# Patient Record
Sex: FEMALE | Race: AMERICAN INDIAN OR ALASKA NATIVE
[De-identification: names, ages, dates, MRNs, and addresses within clinical notes are randomized per-mention and may not be internally consistent; named-entity substitution may affect disease eponyms.]

---

## 2022-01-26 ENCOUNTER — HOSPITAL ENCOUNTER (EMERGENCY)
Dept: HOSPITAL 5 - ED | Age: 72
Discharge: HOME | End: 2022-01-26
Payer: MEDICARE

## 2022-01-26 VITALS — DIASTOLIC BLOOD PRESSURE: 69 MMHG | SYSTOLIC BLOOD PRESSURE: 162 MMHG

## 2022-01-26 DIAGNOSIS — J45.909: ICD-10-CM

## 2022-01-26 DIAGNOSIS — R10.31: Primary | ICD-10-CM

## 2022-01-26 DIAGNOSIS — Z88.0: ICD-10-CM

## 2022-01-26 DIAGNOSIS — Z91.013: ICD-10-CM

## 2022-01-26 LAB
ALBUMIN SERPL-MCNC: 4.7 G/DL (ref 3.9–5)
ALT SERPL-CCNC: 17 UNITS/L (ref 7–56)
BASOPHILS # (AUTO): 0.1 K/MM3 (ref 0–0.1)
BASOPHILS NFR BLD AUTO: 0.8 % (ref 0–1.8)
BILIRUB UR QL STRIP: (no result)
BLOOD UR QL VISUAL: (no result)
BUN SERPL-MCNC: 15 MG/DL (ref 7–17)
BUN/CREAT SERPL: 15 %
CALCIUM SERPL-MCNC: 9.7 MG/DL (ref 8.4–10.2)
EOSINOPHIL # BLD AUTO: 0.1 K/MM3 (ref 0–0.4)
EOSINOPHIL NFR BLD AUTO: 1.3 % (ref 0–4.3)
HCT VFR BLD CALC: 40.8 % (ref 30.3–42.9)
HEMOLYSIS INDEX: 3
HGB BLD-MCNC: 12.7 GM/DL (ref 10.1–14.3)
LYMPHOCYTES # BLD AUTO: 1.7 K/MM3 (ref 1.2–5.4)
LYMPHOCYTES NFR BLD AUTO: 24.7 % (ref 13.4–35)
MCHC RBC AUTO-ENTMCNC: 31 % (ref 30–34)
MCV RBC AUTO: 80 FL (ref 79–97)
MONOCYTES # (AUTO): 0.5 K/MM3 (ref 0–0.8)
MONOCYTES % (AUTO): 7.2 % (ref 0–7.3)
PH UR STRIP: 5 [PH] (ref 5–7)
PLATELET # BLD: 249 K/MM3 (ref 140–440)
PROT UR STRIP-MCNC: (no result) MG/DL
RBC # BLD AUTO: 5.14 M/MM3 (ref 3.65–5.03)
RBC #/AREA URNS HPF: < 1 /HPF (ref 0–6)
UROBILINOGEN UR-MCNC: < 2 MG/DL (ref ?–2)
WBC #/AREA URNS HPF: < 1 /HPF (ref 0–6)

## 2022-01-26 PROCEDURE — 74177 CT ABD & PELVIS W/CONTRAST: CPT

## 2022-01-26 PROCEDURE — 96374 THER/PROPH/DIAG INJ IV PUSH: CPT

## 2022-01-26 PROCEDURE — 36415 COLL VENOUS BLD VENIPUNCTURE: CPT

## 2022-01-26 PROCEDURE — 83690 ASSAY OF LIPASE: CPT

## 2022-01-26 PROCEDURE — 93005 ELECTROCARDIOGRAM TRACING: CPT

## 2022-01-26 PROCEDURE — 85025 COMPLETE CBC W/AUTO DIFF WBC: CPT

## 2022-01-26 PROCEDURE — 96375 TX/PRO/DX INJ NEW DRUG ADDON: CPT

## 2022-01-26 PROCEDURE — 99284 EMERGENCY DEPT VISIT MOD MDM: CPT

## 2022-01-26 PROCEDURE — 81001 URINALYSIS AUTO W/SCOPE: CPT

## 2022-01-26 PROCEDURE — 93010 ELECTROCARDIOGRAM REPORT: CPT

## 2022-01-26 PROCEDURE — 80053 COMPREHEN METABOLIC PANEL: CPT

## 2022-01-26 NOTE — EMERGENCY DEPARTMENT REPORT
ED General Adult HPI





- General


Chief complaint: Abdominal Pain


Stated complaint: RIGHT SIDE ABD PAIN


PUI?: No


Time Seen by Provider: 01/26/22 08:40


Source: patient, RN notes reviewed, old records reviewed


Mode of arrival: Ambulatory


Limitations: No Limitations





- History of Present Illness


Initial comments: 





The patient was evaluated in the emergency department for symptoms described in 

the history of present illness.  He/she was evaluated in the context of the 

global COVID-19 pandemic, which necessitated consideration that the patient 

might be at risk for infection with the virus that causes COVID-19.  

Institutional protocols and algorithms that pertain to the evaluation of 

patients at risk for COVID-19 are in a state of rapid change based on 

information released by regulatory bodies including the CDC and federal and [facility] organizations.  These policies and algorithms were followed during the 

patient's care in the emergency department.  Please note that these policies, 

procedures and recommendations changed on a rapid basis.





Primary CARE doctor: Dr. VANIA Oliveira





The patient is a 71-year-old female.  She is not known to myself previously.  

She reports that she is COVID-19 vaccinated.  She presents to the ER today with 

complaint of nontraumatic abdominal pain.  The abdominal pain is right lower 

quadrant, and right flank and radiates around to the back.  Positive nausea.  No

vomiting.  No fever.  Denies severe headache, neck pain, chest pain, 

hematemesis, bright red blood per rectum.  Pain is sharp and throbbing, 

increases with palpation and decreases with rest.


-: Gradual, hour(s), days(s)


Location: abdomen


Radiation: back


Severity scale (0 -10): 9


Quality: other


Consistency: other


Improves with: other


Worsens with: other


Associated Symptoms: other





- Related Data


                                  Previous Rx's











 Medication  Instructions  Recorded  Last Taken  Type


 


Acetaminophen [Non-Aspirin Extra 500 mg PO Q6HR PRN #30 tablet 01/26/22 Unknown 

Rx





Strength]    


 


Ibuprofen [Motrin] 200 mg PO Q6H PRN #30 tablet 01/26/22 Unknown Rx


 


Ondansetron [Zofran Odt] 4 mg PO Q8HR PRN #20 tab.rapdis 01/26/22 Unknown Rx











                                    Allergies











Allergy/AdvReac Type Severity Reaction Status Date / Time


 


Penicillins Allergy  Shortness Verified 01/26/22 05:34





   of Breath  


 


shellfish derived Allergy  Shortness Verified 01/26/22 05:36





   of Breath  














ED Review of Systems


ROS: 


Stated complaint: RIGHT SIDE ABD PAIN


Other details as noted in HPI








ED Past Medical Hx





- Past Medical History


Previous Medical History?: Yes


Hx Asthma: Yes





- Surgical History


Past Surgical History?: Yes


Additional Surgical History: Bilateral knee replacements, Ectopic pregnancy





- Medications


Home Medications: 


                                Home Medications











 Medication  Instructions  Recorded  Confirmed  Last Taken  Type


 


Acetaminophen [Non-Aspirin Extra 500 mg PO Q6HR PRN #30 tablet 01/26/22  Unknown

 Rx





Strength]     


 


Ibuprofen [Motrin] 200 mg PO Q6H PRN #30 tablet 01/26/22  Unknown Rx


 


Ondansetron [Zofran Odt] 4 mg PO Q8HR PRN #20 tab.rapdis 01/26/22  Unknown Rx














ED Physical Exam





- General


Limitations: No Limitations


General appearance: alert, obese





- Head


Head exam: Present: atraumatic, normocephalic





- Eye


Eye exam: Present: normal appearance, EOMI.  Absent: nystagmus





- ENT


ENT exam: Present: normal exam, normal orophraynx, mucous membranes moist, 

normal external ear exam





- Neck


Neck exam: Present: normal inspection, full ROM.  Absent: tenderness, 

meningismus





- Respiratory


Respiratory exam: Present: normal lung sounds bilaterally.  Absent: respiratory 

distress, wheezes, rales, rhonchi, stridor, decreased breath sounds





- Cardiovascular


Cardiovascular Exam: Present: regular rate, normal rhythm, normal heart sounds. 

Absent: bradycardia, tachycardia, irregular rhythm, systolic murmur, diastolic 

murmur, rubs, gallop





- GI/Abdominal


GI/Abdominal exam: Present: soft, tenderness, other (There is right flank, there

is right lower quadrant tenderness).  Absent: distended, guarding, rebound, 

rigid, pulsatile mass





- Extremities Exam


Extremities exam: Present: normal inspection, full ROM, other (2+ pulses noted 

in the bilateral upper and lower extremities.  There is no palpable cord.   

negative Homans sign.  Muscular compartments are soft.  The pelvis is stable.). 

Absent: pedal edema, calf tenderness





- Back Exam


Back exam: Present: normal inspection, full ROM.  Absent: tenderness, CVA 

tenderness (R), CVA tenderness (L), paraspinal tenderness, vertebral tenderness





- Neurological Exam


Neurological exam: Present: alert, oriented X3, normal gait, other (No facial 

droop.  Tongue midline.  Extraocular movements intact bilaterally.  Facial se

nsation intact to light touch in V1, V2, V3 distribution bilaterally.  5 and a 5

strength in 4 extremities.  Sensation intact to light touch in 4 extremities.). 

Absent: motor sensory deficit





- Psychiatric


Psychiatric exam: Present: normal affect, normal mood





- Skin


Skin exam: Present: warm, dry, intact, normal color.  Absent: rash





ED Course


                                   Vital Signs











  01/26/22





  05:40


 


Temperature 98.9 F


 


Pulse Rate 78


 


Respiratory 20





Rate 


 


Blood Pressure 153/78





[Right] 


 


O2 Sat by Pulse 98





Oximetry 














ED Medical Decision Making





- Lab Data


Result diagrams: 


                                 01/26/22 05:57





                                 01/26/22 05:57








                                   Vital Signs











  01/26/22





  05:40


 


Temperature 98.9 F


 


Pulse Rate 78


 


Respiratory 20





Rate 


 


Blood Pressure 153/78





[Right] 


 


O2 Sat by Pulse 98





Oximetry 











                                   Lab Results











  01/26/22 01/26/22 01/26/22 Range/Units





  05:57 05:57 Unknown 


 


WBC  6.9    (4.5-11.0)  K/mm3


 


RBC  5.14 H    (3.65-5.03)  M/mm3


 


Hgb  12.7    (10.1-14.3)  gm/dl


 


Hct  40.8    (30.3-42.9)  %


 


MCV  80    (79-97)  fl


 


MCH  25 L    (28-32)  pg


 


MCHC  31    (30-34)  %


 


RDW  14.9    (13.2-15.2)  %


 


Plt Count  249    (140-440)  K/mm3


 


Lymph % (Auto)  24.7    (13.4-35.0)  %


 


Mono % (Auto)  7.2    (0.0-7.3)  %


 


Eos % (Auto)  1.3    (0.0-4.3)  %


 


Baso % (Auto)  0.8    (0.0-1.8)  %


 


Lymph # (Auto)  1.7    (1.2-5.4)  K/mm3


 


Mono # (Auto)  0.5    (0.0-0.8)  K/mm3


 


Eos # (Auto)  0.1    (0.0-0.4)  K/mm3


 


Baso # (Auto)  0.1    (0.0-0.1)  K/mm3


 


Seg Neutrophils %  66.0    (40.0-70.0)  %


 


Seg Neutrophils #  4.5    (1.8-7.7)  K/mm3


 


Sodium   140   (137-145)  mmol/L


 


Potassium   3.7   (3.6-5.0)  mmol/L


 


Chloride   100.9   ()  mmol/L


 


Carbon Dioxide   25   (22-30)  mmol/L


 


Anion Gap   18   mmol/L


 


BUN   15   (7-17)  mg/dL


 


Creatinine   1.0   (0.6-1.2)  mg/dL


 


Estimated GFR   55   ml/min


 


BUN/Creatinine Ratio   15   %


 


Glucose   134 H   ()  mg/dL


 


Calcium   9.7   (8.4-10.2)  mg/dL


 


Total Bilirubin   0.30   (0.1-1.2)  mg/dL


 


AST   19   (5-40)  units/L


 


ALT   17   (7-56)  units/L


 


Alkaline Phosphatase   76   ()  units/L


 


Total Protein   7.7   (6.3-8.2)  g/dL


 


Albumin   4.7   (3.9-5)  g/dL


 


Albumin/Globulin Ratio   1.6   %


 


Lipase   37   (13-60)  units/L


 


Urine Color    Yellow  (Yellow)  


 


Urine Turbidity    Clear  (Clear)  


 


Urine pH    5.0  (5.0-7.0)  


 


Ur Specific Gravity    1.015  (1.003-1.030)  


 


Urine Protein    <15 mg/dl  (Negative)  mg/dL


 


Urine Glucose (UA)    Neg  (Negative)  mg/dL


 


Urine Ketones    Neg  (Negative)  mg/dL


 


Urine Blood    Neg  (Negative)  


 


Urine Nitrite    Neg  (Negative)  


 


Urine Bilirubin    Neg  (Negative)  


 


Urine Urobilinogen    < 2.0  (<2.0)  mg/dL


 


Ur Leukocyte Esterase    Neg  (Negative)  


 


Urine WBC (Auto)    < 1.0  (0.0-6.0)  /HPF


 


Urine RBC (Auto)    < 1.0  (0.0-6.0)  /HPF














- EKG Data


-: EKG Interpreted by Me


EKG shows normal: sinus rhythm


Rate: normal





- EKG Data


When compared to previous EKG there are: previous EKG unavailable





01/26/22 10:58


The EKG is interpreted at 08: 35





Sinus rhythm, 73 bpm.  Normal axis, normal P wave axis, left ventricular 

hypertrophy, QTC 4 4 6 ms.  Abnormal EKG.  Not a STEMI.





- Radiology Data


Radiology results: pending, report reviewed, image reviewed





CT ABDOMEN AND PELVIS WITH CONTRAST  INDICATION / CLINICAL INFORMATION: rlq 

right flank abd pain 100 ML OMNI 300 .  TECHNIQUE: Axial CT images were obtained

 through the abdomen and pelvis after 100 cc of Omnipaque 300 IV contrast. 

Sagittal and coronal reformatted images. All CT scans at this location are p

erformed using CT dose reduction for ALARA by means of automated exposure 

control.  











COMPARISON: None available.  FINDINGS: LOWER CHEST: No significant abnormality. 

LIVER: No significant abnormality. GALLBLADDER: No significant abnormality. BILE

 DUCTS: No significant abnormality. PANCREAS: No significant abnormality. 

SPLEEN: No significant abnormality. ADRENALS: No significant abnormality. RIGHT 

KIDNEY and URETER: No significant abnormality. LEFT KIDNEY and URETER: No 

significant abnormality.  STOMACH and SMALL BOWEL: No significant abnormality. 

COLON: No significant abnormality. Mild diverticulosis is noted distally. 

APPENDIX: No significant abnormality. PERITONEUM: No free fluid. No free air. No

 fluid collection. LYMPH NODES: No significant adenopathy. AORTA and ARTERIES: 

Mild atherosclerotic calcification without acute abnormality. IVC and VEINS: No 

significant abnormality.  URINARY BLADDER: No significant abnormality. 

REPRODUCTIVE ORGANS: No significant abnormality.  











ADDITIONAL FINDINGS: None.  SKELETAL SYSTEM: Mild degenerative changes in the 

lower lumbar spine.  











IMPRESSION: No acute inflammatory process. No clear explanation for right lower 

quadrant or right flank pain.  Signer Name: Tyson Damon Jr, MD Signed: 

1/26/2022 9:32 AM Workstation Name: [id number]





- Medical Decision Making





Differential diagnosis, including but not limited to: Appendicitis, renal colic,

 colitis, diverticulitis, constipation, obstruction, radicular pain


Allodynia











Assessment and plan: 71-year-old female, who was afebrile, with reassuring vital

 signs, who presents with right lower quadrant and right flank pain.  Laboratory

 studies unremarkable.  EKG unremarkable.  Discussed recommendation for CT scan 

of the abdomen pelvis with IV contrast.  Patient reports nonspecific rash 

associated with iodine, she is not quite certain if she has had IV contrast in 

the past, but to the best of her knowledge, does not have anaphylactic or 

anaphylactoid association with iodine administration.  Patient premedicated 

appropriately, and a CT scan of the abdomen pelvis is obtained, which shows no 

acute findings.  On repeat examination, patient on cell phone, and in no acute 

distress, with a soft benign belly.





Explained significance of laboratory studies and CT scan findings to patient.





Have recommended 24-hour follow-up for repeat checkup and evaluation.





Given that she does not have significant abdominal tenderness at this time, 

given that she appears to be resting comfortably, has unremarkable laboratory 

studies, vital signs, diagnostic work-up and evaluation, and reports reliability

 to follow-up in 24 hours for repeat checkup, I think discharge with close 

return precautions is reasonable.  I have discussed this with the patient.  She 

articulates understanding.





Return precautions reviewed.








Critical care attestation.: 


If time is entered above; I have spent that time in minutes in the direct care 

of this critically ill patient, excluding procedure time.








ED Disposition


Clinical Impression: 


 Right sided abdominal pain





Disposition: 01 HOME / SELF CARE / HOMELESS


Is pt being admited?: No


Does the pt Need Aspirin: No


Condition: Good


Instructions:  Abdominal Pain (ED), Abdominal Pain, Adult, Easy-to-Read


Additional Instructions: 


Do not take metformin medication for the next 2 days, if patient takes this 

medication.





Please take the prescribed pain medication and nausea medications as needed and 

directed.





Recommend follow-up in 1 day / 24 hours with either primary care doctor, urgent 

care center, or return to this emergency room for repeat checkup, evaluation, 

and repeat abdominal examination.





Laboratory studies, EKG, urinalysis, CT scan of the abdomen pelvis did not 

demonstrate any acutely abnormal findings, or any actionable findings that would

 require emergent intervention.





Please return to the emergency room right away with new pain, worsened pain, 

migration of pain, projectile vomiting, change in mental status, confusion, 

inability tolerate liquid feeds, new, worsened or different symptoms not present

 on the initial emergency room evaluation


Referrals: 


[facility] [Provider Group] - 24 Hours

## 2022-01-26 NOTE — CAT SCAN REPORT
CT ABDOMEN AND PELVIS WITH CONTRAST



INDICATION / CLINICAL INFORMATION: rlq right flank abd pain  100 ML OMNI 300 .



TECHNIQUE:

Axial CT images were obtained through the abdomen and pelvis after 100 cc of Omnipaque 300 IV contras
t. Sagittal and coronal reformatted images. All CT scans at this location are performed using CT dose
 reduction for ALARA by means of automated exposure control. 



COMPARISON: None available.



FINDINGS:

LOWER CHEST: No significant abnormality.

LIVER: No significant abnormality.

GALLBLADDER: No significant abnormality.  

BILE DUCTS: No significant abnormality.

PANCREAS: No significant abnormality.

SPLEEN: No significant abnormality.

ADRENALS: No significant abnormality.

RIGHT KIDNEY and URETER: No significant abnormality.

LEFT KIDNEY and URETER: No significant abnormality.



STOMACH and SMALL BOWEL: No significant abnormality. 

COLON: No significant abnormality. Mild diverticulosis is noted distally.

APPENDIX: No significant abnormality.  

PERITONEUM: No free fluid. No free air. No fluid collection.

LYMPH NODES: No significant adenopathy.

AORTA and ARTERIES: Mild atherosclerotic calcification without acute abnormality. 

IVC and VEINS: No significant abnormality.



URINARY BLADDER: No significant abnormality.

REPRODUCTIVE ORGANS: No significant abnormality.



ADDITIONAL FINDINGS: None.



SKELETAL SYSTEM: Mild degenerative changes in the lower lumbar spine.



IMPRESSION:

 No acute inflammatory process. No clear explanation for right lower quadrant or right flank pain.



Signer Name: Tyson Damon Jr, MD 

Signed: 1/26/2022 10:32 AM

Workstation Name: [id number]

## 2022-01-27 NOTE — ELECTROCARDIOGRAPH REPORT
[facility]

                                       

Test Date:    2022               Test Time:    08:35:37

Pat Name:     ERINN MOREL         Department:   

Patient ID:   [id number]          Room:          

Gender:       F                        Technician:   ALFREDA

:          1950               Requested By: CHRIS DEMPSEY

Order Number: [id number]              Reading MD:   Pieter Nogueira

                                 Measurements

Intervals                              Axis          

Rate:         73                       P:            73

VA:           168                      QRS:          35

QRSD:         76                       T:            55

QT:           405                                    

QTc:          446                                    

                           Interpretive Statements

Sinus rhythm

nonspecific st-t

No previous ECG available for comparison

Electronically Signed On 2022 8:47:21 EST by Pieter Nogueira

## 2022-04-01 ENCOUNTER — DASHBOARD ENCOUNTERS (OUTPATIENT)
Age: 72
End: 2022-04-01

## 2022-04-01 ENCOUNTER — OFFICE VISIT (OUTPATIENT)
Dept: URBAN - METROPOLITAN AREA CLINIC 109 | Facility: CLINIC | Age: 72
End: 2022-04-01
Payer: MEDICARE

## 2022-04-01 VITALS
HEIGHT: 63 IN | BODY MASS INDEX: 41.53 KG/M2 | HEART RATE: 92 BPM | WEIGHT: 234.4 LBS | DIASTOLIC BLOOD PRESSURE: 72 MMHG | TEMPERATURE: 97.9 F | SYSTOLIC BLOOD PRESSURE: 139 MMHG

## 2022-04-01 DIAGNOSIS — Z12.11 SCREEN FOR COLON CANCER: ICD-10-CM

## 2022-04-01 DIAGNOSIS — K59.01 CONSTIPATION BY DELAYED COLONIC TRANSIT: ICD-10-CM

## 2022-04-01 DIAGNOSIS — K59.1 FUNCTIONAL DIARRHEA: ICD-10-CM

## 2022-04-01 DIAGNOSIS — R14.0 BLOATING: ICD-10-CM

## 2022-04-01 PROBLEM — 35298007: Status: ACTIVE | Noted: 2022-04-01

## 2022-04-01 PROCEDURE — 99204 OFFICE O/P NEW MOD 45 MIN: CPT | Performed by: INTERNAL MEDICINE

## 2022-04-01 RX ORDER — ROSUVASTATIN CALCIUM 20 MG/1
1 TABLET TABLET, FILM COATED ORAL ONCE A DAY
Status: ACTIVE | COMMUNITY

## 2022-04-01 RX ORDER — ROSUVASTATIN CALCIUM 10 MG/1
1 TABLET TABLET, FILM COATED ORAL ONCE A DAY
Status: ACTIVE | COMMUNITY

## 2022-04-01 RX ORDER — LISINOPRIL AND HYDROCHLOROTHIAZIDE 20; 12.5 MG/1; MG/1
1 TABLET TABLET ORAL ONCE A DAY
Status: ACTIVE | COMMUNITY

## 2022-04-01 RX ORDER — AMLODIPINE BESYLATE 5 MG/1
1 TABLET TABLET ORAL ONCE A DAY
Status: ACTIVE | COMMUNITY

## 2022-04-01 RX ORDER — DICYCLOMINE HYDROCHLORIDE 10 MG/1
1 TABLET CAPSULE ORAL THREE TIMES A DAY
Qty: 90 TABLET | Refills: 6 | OUTPATIENT
Start: 2022-04-01 | End: 2022-10-28

## 2022-04-01 RX ORDER — MONTELUKAST 10 MG/1
1 TABLET TABLET, FILM COATED ORAL ONCE A DAY
Status: ACTIVE | COMMUNITY

## 2022-04-01 NOTE — HPI-TODAY'S VISIT:
here for routine screening colon, last was 10+ years ago reports colonoscopy always normal reports for 1+ years lower cramping abd pain, stable, intermittant, worse with need for bm and better with bm and associated with constipation and or diarrhea and fecal urgency, will have good weeks and bad weeks, cannot define a particular trigger for the symptoms

## 2022-04-26 ENCOUNTER — OFFICE VISIT (OUTPATIENT)
Dept: URBAN - METROPOLITAN AREA SURGERY CENTER 23 | Facility: SURGERY CENTER | Age: 72
End: 2022-04-26
Payer: MEDICARE

## 2022-04-26 ENCOUNTER — CLAIMS CREATED FROM THE CLAIM WINDOW (OUTPATIENT)
Dept: URBAN - METROPOLITAN AREA CLINIC 4 | Facility: CLINIC | Age: 72
End: 2022-04-26
Payer: MEDICARE

## 2022-04-26 DIAGNOSIS — D12.2 BENIGN NEOPLASM OF ASCENDING COLON: ICD-10-CM

## 2022-04-26 DIAGNOSIS — D12.2 ADENOMA OF ASCENDING COLON: ICD-10-CM

## 2022-04-26 DIAGNOSIS — Z12.11 COLON CANCER SCREENING: ICD-10-CM

## 2022-04-26 DIAGNOSIS — K63.89 CYST OF DUODENUM: ICD-10-CM

## 2022-04-26 PROCEDURE — 45385 COLONOSCOPY W/LESION REMOVAL: CPT | Performed by: INTERNAL MEDICINE

## 2022-04-26 PROCEDURE — 88305 TISSUE EXAM BY PATHOLOGIST: CPT | Performed by: PATHOLOGY

## 2022-04-26 PROCEDURE — G8907 PT DOC NO EVENTS ON DISCHARG: HCPCS | Performed by: INTERNAL MEDICINE

## 2022-04-26 RX ORDER — AMLODIPINE BESYLATE 5 MG/1
1 TABLET TABLET ORAL ONCE A DAY
Status: ACTIVE | COMMUNITY

## 2022-04-26 RX ORDER — DICYCLOMINE HYDROCHLORIDE 10 MG/1
1 TABLET CAPSULE ORAL THREE TIMES A DAY
Qty: 90 TABLET | Refills: 6 | Status: ACTIVE | COMMUNITY
Start: 2022-04-01 | End: 2022-10-28

## 2022-04-26 RX ORDER — ROSUVASTATIN CALCIUM 20 MG/1
1 TABLET TABLET, FILM COATED ORAL ONCE A DAY
Status: ACTIVE | COMMUNITY

## 2022-04-26 RX ORDER — MONTELUKAST 10 MG/1
1 TABLET TABLET, FILM COATED ORAL ONCE A DAY
Status: ACTIVE | COMMUNITY

## 2022-04-26 RX ORDER — ROSUVASTATIN CALCIUM 10 MG/1
1 TABLET TABLET, FILM COATED ORAL ONCE A DAY
Status: ACTIVE | COMMUNITY

## 2022-04-26 RX ORDER — LISINOPRIL AND HYDROCHLOROTHIAZIDE 20; 12.5 MG/1; MG/1
1 TABLET TABLET ORAL ONCE A DAY
Status: ACTIVE | COMMUNITY

## 2025-06-03 ENCOUNTER — WEB ENCOUNTER (OUTPATIENT)
Dept: URBAN - METROPOLITAN AREA CLINIC 118 | Facility: CLINIC | Age: 75
End: 2025-06-03

## 2025-06-09 ENCOUNTER — TELEPHONE ENCOUNTER (OUTPATIENT)
Dept: URBAN - METROPOLITAN AREA CLINIC 109 | Facility: CLINIC | Age: 75
End: 2025-06-09

## 2025-06-09 ENCOUNTER — LAB OUTSIDE AN ENCOUNTER (OUTPATIENT)
Dept: URBAN - METROPOLITAN AREA SURGERY CENTER 23 | Facility: SURGERY CENTER | Age: 75
End: 2025-06-09

## 2025-06-10 ENCOUNTER — CLAIMS CREATED FROM THE CLAIM WINDOW (OUTPATIENT)
Dept: URBAN - METROPOLITAN AREA SURGERY CENTER 23 | Facility: SURGERY CENTER | Age: 75
End: 2025-06-10
Payer: MEDICARE

## 2025-06-10 ENCOUNTER — CLAIMS CREATED FROM THE CLAIM WINDOW (OUTPATIENT)
Dept: URBAN - METROPOLITAN AREA CLINIC 4 | Facility: CLINIC | Age: 75
End: 2025-06-10
Payer: MEDICARE

## 2025-06-10 DIAGNOSIS — D12.5 BENIGN NEOPLASM OF SIGMOID COLON: ICD-10-CM

## 2025-06-10 DIAGNOSIS — D12.4 ADENOMA OF DESCENDING COLON: ICD-10-CM

## 2025-06-10 DIAGNOSIS — Z86.0100 PERSONAL HISTORY OF COLON POLYPS, UNSPECIFIED: ICD-10-CM

## 2025-06-10 DIAGNOSIS — Z12.11 COLON CANCER SCREENING: ICD-10-CM

## 2025-06-10 DIAGNOSIS — Z86.0100 PERSONAL HISTORY OF COLONIC POLYPS: ICD-10-CM

## 2025-06-10 DIAGNOSIS — D12.5 ADENOMA OF SIGMOID COLON: ICD-10-CM

## 2025-06-10 DIAGNOSIS — D12.4 BENIGN NEOPLASM OF DESCENDING COLON: ICD-10-CM

## 2025-06-10 DIAGNOSIS — K63.5 POLYP OF COLON: ICD-10-CM

## 2025-06-10 PROCEDURE — 45385 COLONOSCOPY W/LESION REMOVAL: CPT | Performed by: INTERNAL MEDICINE

## 2025-06-10 PROCEDURE — 00811 ANES LWR INTST NDSC NOS: CPT

## 2025-06-10 PROCEDURE — 0529F INTRVL 3/>YR PTS CLNSCP DOCD: CPT | Performed by: INTERNAL MEDICINE

## 2025-06-10 PROCEDURE — 45380 COLONOSCOPY AND BIOPSY: CPT | Performed by: INTERNAL MEDICINE

## 2025-06-10 PROCEDURE — 88305 TISSUE EXAM BY PATHOLOGIST: CPT | Performed by: PATHOLOGY

## 2025-06-10 RX ORDER — ROSUVASTATIN 20 MG/1
1 TABLET TABLET, FILM COATED ORAL ONCE A DAY
Status: ACTIVE | COMMUNITY

## 2025-06-10 RX ORDER — ROSUVASTATIN 10 MG/1
1 TABLET TABLET, FILM COATED ORAL ONCE A DAY
Status: ACTIVE | COMMUNITY

## 2025-06-10 RX ORDER — AMLODIPINE BESYLATE 5 MG/1
1 TABLET TABLET ORAL ONCE A DAY
Status: ACTIVE | COMMUNITY

## 2025-06-10 RX ORDER — LISINOPRIL AND HYDROCHLOROTHIAZIDE 20; 12.5 MG/1; MG/1
1 TABLET TABLET ORAL ONCE A DAY
Status: ACTIVE | COMMUNITY

## 2025-06-10 RX ORDER — MONTELUKAST 10 MG/1
1 TABLET TABLET, FILM COATED ORAL ONCE A DAY
Status: ACTIVE | COMMUNITY